# Patient Record
Sex: FEMALE | Race: WHITE | NOT HISPANIC OR LATINO | Employment: FULL TIME | URBAN - METROPOLITAN AREA
[De-identification: names, ages, dates, MRNs, and addresses within clinical notes are randomized per-mention and may not be internally consistent; named-entity substitution may affect disease eponyms.]

---

## 2024-10-29 ENCOUNTER — OFFICE VISIT (OUTPATIENT)
Dept: URGENT CARE | Facility: CLINIC | Age: 29
End: 2024-10-29
Payer: MEDICAID

## 2024-10-29 VITALS
HEIGHT: 68 IN | BODY MASS INDEX: 16.82 KG/M2 | SYSTOLIC BLOOD PRESSURE: 119 MMHG | DIASTOLIC BLOOD PRESSURE: 75 MMHG | TEMPERATURE: 99 F | RESPIRATION RATE: 18 BRPM | OXYGEN SATURATION: 98 % | HEART RATE: 65 BPM | WEIGHT: 111 LBS

## 2024-10-29 DIAGNOSIS — R05.9 COUGH, UNSPECIFIED TYPE: Primary | ICD-10-CM

## 2024-10-29 DIAGNOSIS — R82.998 DARK URINE: ICD-10-CM

## 2024-10-29 DIAGNOSIS — J22 ACUTE RESPIRATORY INFECTION: ICD-10-CM

## 2024-10-29 LAB
BILIRUBIN, UA POC OHS: NEGATIVE
BLOOD, UA POC OHS: ABNORMAL
CLARITY, UA POC OHS: CLEAR
COLOR, UA POC OHS: COLORLESS
CTP QC/QA: YES
GLUCOSE, UA POC OHS: NEGATIVE
KETONES, UA POC OHS: NEGATIVE
LEUKOCYTES, UA POC OHS: NEGATIVE
MOLECULAR STREP A: NEGATIVE
NITRITE, UA POC OHS: NEGATIVE
PH, UA POC OHS: 7
POC MOLECULAR INFLUENZA A AGN: NEGATIVE
POC MOLECULAR INFLUENZA B AGN: NEGATIVE
POC RSV RAPID ANT MOLECULAR: NEGATIVE
PROTEIN, UA POC OHS: NEGATIVE
SARS-COV-2 AG RESP QL IA.RAPID: NEGATIVE
SPECIFIC GRAVITY, UA POC OHS: 1.01
UROBILINOGEN, UA POC OHS: 0.2

## 2024-10-29 PROCEDURE — 87634 RSV DNA/RNA AMP PROBE: CPT | Mod: QW,S$GLB,, | Performed by: FAMILY MEDICINE

## 2024-10-29 PROCEDURE — 81003 URINALYSIS AUTO W/O SCOPE: CPT | Mod: QW,S$GLB,, | Performed by: FAMILY MEDICINE

## 2024-10-29 PROCEDURE — 99214 OFFICE O/P EST MOD 30 MIN: CPT | Mod: S$GLB,,, | Performed by: FAMILY MEDICINE

## 2024-10-29 PROCEDURE — 87811 SARS-COV-2 COVID19 W/OPTIC: CPT | Mod: QW,S$GLB,, | Performed by: FAMILY MEDICINE

## 2024-10-29 PROCEDURE — 87502 INFLUENZA DNA AMP PROBE: CPT | Mod: QW,S$GLB,, | Performed by: FAMILY MEDICINE

## 2024-10-29 PROCEDURE — 87651 STREP A DNA AMP PROBE: CPT | Mod: QW,S$GLB,, | Performed by: FAMILY MEDICINE

## 2024-10-29 RX ORDER — METHYLPREDNISOLONE 4 MG/1
TABLET ORAL
Qty: 21 EACH | Refills: 0 | Status: SHIPPED | OUTPATIENT
Start: 2024-10-29 | End: 2024-11-19

## 2024-10-29 RX ORDER — ALBUTEROL SULFATE 90 UG/1
2 INHALANT RESPIRATORY (INHALATION) EVERY 6 HOURS PRN
Qty: 18 G | Refills: 0 | Status: SHIPPED | OUTPATIENT
Start: 2024-10-29

## 2024-10-29 RX ORDER — ACETAMINOPHEN 500 MG
1000 TABLET ORAL
Status: COMPLETED | OUTPATIENT
Start: 2024-10-29 | End: 2024-10-29

## 2024-10-29 RX ORDER — PROMETHAZINE HYDROCHLORIDE AND DEXTROMETHORPHAN HYDROBROMIDE 6.25; 15 MG/5ML; MG/5ML
SYRUP ORAL
Qty: 150 ML | Refills: 0 | Status: SHIPPED | OUTPATIENT
Start: 2024-10-29

## 2024-10-29 RX ADMIN — Medication 1000 MG: at 11:10

## 2024-12-15 ENCOUNTER — OFFICE VISIT (OUTPATIENT)
Dept: URGENT CARE | Facility: CLINIC | Age: 29
End: 2024-12-15
Payer: MEDICAID

## 2024-12-15 VITALS
TEMPERATURE: 100 F | DIASTOLIC BLOOD PRESSURE: 72 MMHG | SYSTOLIC BLOOD PRESSURE: 109 MMHG | BODY MASS INDEX: 16.88 KG/M2 | HEART RATE: 87 BPM | RESPIRATION RATE: 18 BRPM | WEIGHT: 111 LBS | OXYGEN SATURATION: 98 %

## 2024-12-15 DIAGNOSIS — R09.82 POSTNASAL DRIP: ICD-10-CM

## 2024-12-15 DIAGNOSIS — R05.9 COUGH, UNSPECIFIED TYPE: ICD-10-CM

## 2024-12-15 DIAGNOSIS — J10.1 INFLUENZA A: Primary | ICD-10-CM

## 2024-12-15 LAB
CTP QC/QA: YES
POC MOLECULAR INFLUENZA A AGN: POSITIVE
POC MOLECULAR INFLUENZA B AGN: NEGATIVE

## 2024-12-15 PROCEDURE — 99213 OFFICE O/P EST LOW 20 MIN: CPT | Mod: S$GLB,,,

## 2024-12-15 PROCEDURE — 87502 INFLUENZA DNA AMP PROBE: CPT | Mod: QW,S$GLB,,

## 2024-12-15 RX ORDER — CETIRIZINE HYDROCHLORIDE 10 MG/1
10 TABLET ORAL DAILY
Qty: 10 TABLET | Refills: 0 | Status: SHIPPED | OUTPATIENT
Start: 2024-12-15 | End: 2024-12-25

## 2024-12-15 RX ORDER — OSELTAMIVIR PHOSPHATE 75 MG/1
75 CAPSULE ORAL 2 TIMES DAILY
Qty: 10 CAPSULE | Refills: 0 | Status: SHIPPED | OUTPATIENT
Start: 2024-12-15 | End: 2024-12-20

## 2024-12-15 NOTE — LETTER
December 15, 2024      [facility] [place]  [address], [address]  [facility] [address]  Phone: [phone]  Fax: [phone]       Patient: Amina Sebastian   YOB: 1995  Date of Visit: 12/15/2024    To Whom It May Concern:    SIERRA Sebastian  was at [facility] on 12/15/2024. The patient may return to work/school on 12/18/24 with no restrictions. If you have any questions or concerns, or if I can be of further assistance, please do not hesitate to contact me.    Sincerely,    Nhung Camargo NP

## 2024-12-15 NOTE — PROGRESS NOTES
Subjective:      Patient ID: Amina Sebastian is a 29 y.o. female.    Vitals:  weight is 50.3 kg (111 lb). Her tympanic temperature is 100.4 °F (38 °C) (abnormal). Her blood pressure is 109/72 and her pulse is 87. Her respiration is 18 and oxygen saturation is 98%.     Chief Complaint: Cough    Pt presents today with cough, congestion, body aches, and fever. Pt has taken tylenol at 12pm with mild relief of fever. Pt has no known exposure to covid, flu or strep.     Cough  This is a new problem. The current episode started in the past 7 days. The problem has been unchanged. The problem occurs constantly. The cough is Non-productive. Associated symptoms include a fever and postnasal drip. Pertinent negatives include no chest pain, rash or sore throat.       Constitution: Positive for fever.   HENT:  Positive for postnasal drip. Negative for sore throat.    Cardiovascular:  Negative for chest pain and palpitations.   Respiratory:  Positive for cough.    Gastrointestinal:  Negative for nausea, vomiting and diarrhea.   Musculoskeletal:  Negative for joint pain.   Skin:  Negative for rash.      Objective:     Physical Exam   Constitutional: She is oriented to person, place, and time. normal  HENT:   Head: Normocephalic and atraumatic.   Ears:   Right Ear: Tympanic membrane, external ear and ear canal normal.   Left Ear: Tympanic membrane, external ear and ear canal normal.   Nose: Rhinorrhea present.   Mouth/Throat: Mucous membranes are moist. Oropharynx is clear.   Eyes: Conjunctivae are normal. Pupils are equal, round, and reactive to light.   Neck: Neck supple.   Cardiovascular: Normal rate, regular rhythm, normal heart sounds and normal pulses.   Pulmonary/Chest: Effort normal and breath sounds normal.   Abdominal: Normal appearance. There is no abdominal tenderness.   Musculoskeletal: Normal range of motion.         General: Normal range of motion.   Neurological: She is alert and oriented to person, place, and  time.   Skin: Skin is warm and dry. Capillary refill takes less than 2 seconds.   Psychiatric: Mood normal.       Assessment:     1. Influenza A    2. Cough, unspecified type    3. Postnasal drip          Plan:     You have been diagnosed with Influenza.   You are contagious for usually the first 4 days of symptoms and for 24 hours after your last fever.  Please drink plenty of fluids.  Please get plenty of rest.  Please return here or go to the Emergency Department for any concerns or worsening of condition.  If an antiviral prescription such as Tamiflu or Xofluza has been discussed and if prescribed, please take to completion unless you cannot tolerate the side effects.   If you were prescribed a narcotic medication, do not drive or operate heavy equipment or machinery while taking these medications.  If you were given a steroid shot in the clinic and have also been given a prescription for a steroid such as Prednisone or a Medrol Dose Pack, please begin taking them tomorrow.  Take tylenol (acetominophen) for fever, chills or body aches every 4 hours. do not exceed 4000 mg/ day.  OR you may take Motrin (Ibuprofen) 600mg every 6 hours for fever, chills, pain or inflammation.  Cough and Cold Medications as needed. Use an antihistmine such as claritin or zyrtec to dry you out if you have a runny nose or postnasal drip. Use pseudoephedrine (behind the counter) to decongest (be aware this can raise your blood pressure and may cause palpitations. Delsym or Robitussin for cough. Use mucinex (guaifenisin) to break up mucous. Mucinex DM is helpful if you are congested and have a cough.     Influenza A  -     oseltamivir (TAMIFLU) 75 MG capsule; Take 1 capsule (75 mg total) by mouth 2 (two) times daily. for 5 days  Dispense: 10 capsule; Refill: 0    Cough, unspecified type  -     POCT Influenza A/B MOLECULAR    Postnasal drip  -     cetirizine (ZYRTEC) 10 MG tablet; Take 1 tablet (10 mg total) by mouth once daily. for 10  days  Dispense: 10 tablet; Refill: 0          Medical Decision Making:   Urgent Care Management:  Flu positive

## 2025-07-18 ENCOUNTER — OFFICE VISIT (OUTPATIENT)
Dept: URGENT CARE | Facility: CLINIC | Age: 30
End: 2025-07-18

## 2025-07-18 VITALS
OXYGEN SATURATION: 98 % | TEMPERATURE: 99 F | SYSTOLIC BLOOD PRESSURE: 103 MMHG | RESPIRATION RATE: 16 BRPM | HEART RATE: 66 BPM | DIASTOLIC BLOOD PRESSURE: 68 MMHG

## 2025-07-18 DIAGNOSIS — H10.9 CONJUNCTIVITIS OF RIGHT EYE, UNSPECIFIED CONJUNCTIVITIS TYPE: Primary | ICD-10-CM

## 2025-07-18 RX ORDER — TOBRAMYCIN 3 MG/ML
1 SOLUTION/ DROPS OPHTHALMIC EVERY 6 HOURS
Qty: 5 ML | Refills: 0 | Status: SHIPPED | OUTPATIENT
Start: 2025-07-18

## 2025-07-18 RX ORDER — TESTOSTERONE CYPIONATE 200 MG/ML
INJECTION, SOLUTION INTRAMUSCULAR
COMMUNITY
Start: 2025-05-16

## 2025-07-18 RX ORDER — AZELASTINE 1 MG/ML
2 SPRAY, METERED NASAL 2 TIMES DAILY
Qty: 30 ML | Refills: 2 | Status: SHIPPED | OUTPATIENT
Start: 2025-07-18

## 2025-07-18 RX ORDER — KETOROLAC TROMETHAMINE 5 MG/ML
1 SOLUTION OPHTHALMIC 2 TIMES DAILY PRN
Qty: 5 ML | Refills: 1 | Status: SHIPPED | OUTPATIENT
Start: 2025-07-18 | End: 2025-07-25

## 2025-07-18 NOTE — PROGRESS NOTES
Subjective:      Patient ID: Amina Sebastian is a 29 y.o. female.    Vitals:  oral temperature is 98.7 °F (37.1 °C). Her blood pressure is 103/68 and her pulse is 66. Her respiration is 16 and oxygen saturation is 98%.     Chief Complaint: Eye Problem    Pt c/o swelling around her right eye that started this morning when she woke up. Pt states it feels like something is in her eye.    Eye Problem   The right eye is affected. This is a new problem. The current episode started today. The problem occurs constantly. The problem has been unchanged. The injury mechanism is unknown. The patient is experiencing no pain. Associated symptoms include a foreign body sensation, itching and photophobia. Pertinent negatives include no blurred vision, eye discharge, double vision, eye redness, fever, nausea, recent URI or vomiting. She has tried eye drops for the symptoms. The treatment provided no relief.       Constitution: Negative for fever.   Eyes:  Positive for eye itching and photophobia. Negative for eye discharge, eye redness, double vision and blurred vision.   Gastrointestinal:  Negative for nausea and vomiting.      Objective:     Vitals:    07/18/25 0901   BP: 103/68   BP Location: Left arm   Patient Position: Sitting   Pulse: 66   Resp: 16   Temp: 98.7 °F (37.1 °C)   TempSrc: Oral   SpO2: 98%     Physical Exam   Constitutional: She is oriented to person, place, and time. She appears well-developed. She is cooperative.  Non-toxic appearance. She does not appear ill. No distress.   HENT:   Head: Normocephalic and atraumatic.   Ears:   Right Ear: Hearing, tympanic membrane, external ear and ear canal normal.   Left Ear: Hearing, tympanic membrane, external ear and ear canal normal.   Nose: Congestion present. No mucosal edema, rhinorrhea or nasal deformity. No epistaxis. Right sinus exhibits no maxillary sinus tenderness and no frontal sinus tenderness. Left sinus exhibits no maxillary sinus tenderness and no frontal  sinus tenderness.   Mouth/Throat: Uvula is midline, oropharynx is clear and moist and mucous membranes are normal. Mucous membranes are moist. No trismus in the jaw. Normal dentition. No uvula swelling. No oropharyngeal exudate, posterior oropharyngeal edema or posterior oropharyngeal erythema.   Eyes: Lids are normal. Pupils are equal, round, and reactive to light. Lids are everted and swept, no foreign bodies found. No scleral icterus.     Extraocular movement intact vision grossly intact gaze aligned appropriately periorbital hyperpigmentation   Neck: Trachea normal and phonation normal. Neck supple. No edema present. No erythema present. No neck rigidity present.   Cardiovascular: Normal rate, regular rhythm, normal heart sounds and normal pulses.   Pulmonary/Chest: Effort normal and breath sounds normal. No respiratory distress. She has no decreased breath sounds. She has no rhonchi.   Abdominal: Normal appearance.   Musculoskeletal: Normal range of motion.         General: No deformity. Normal range of motion.   Neurological: She is alert and oriented to person, place, and time. She exhibits normal muscle tone. Coordination normal.   Skin: Skin is warm, dry, intact, not diaphoretic and not pale.   Psychiatric: Her speech is normal and behavior is normal. Judgment and thought content normal.   Nursing note and vitals reviewed.          Assessment:     1. Conjunctivitis of right eye, unspecified conjunctivitis type        Plan:       Conjunctivitis of right eye, unspecified conjunctivitis type  -     azelastine (ASTELIN) 137 mcg (0.1 %) nasal spray; 2 sprays (274 mcg total) by Nasal route 2 (two) times daily.  Dispense: 30 mL; Refill: 2  -     ketorolac 0.5% (ACULAR) 0.5 % Drop; Place 1 drop into both eyes 2 (two) times daily as needed (itchy eye symptoms). Wash hands before and after admin  Dispense: 5 mL; Refill: 1  -     tobramycin sulfate 0.3% (TOBREX) 0.3 % ophthalmic solution; Place 1 drop into the right  eye every 6 (six) hours. Wash hands before and after admin  Dispense: 5 mL; Refill: 0      Patient Instructions   Wash hands before and after admin of eye drops   Avoid rubbing eyes   Change linens as needed   Allergy meds as discussed and sent to pharmacy   If purulent eye discharge develops, start tobramycin eye drops   Follow up with your PCP as needed or return to [facility]      May follow up with [facility] until  08/01/2025 without further charges for today's complaint and concerns      No follow-ups on file.

## 2025-07-18 NOTE — LETTER
July 18, 2025      [facility]  [address], [address]  [facility] [address]  Phone: [phone]  Fax: [phone]       Patient: Amina Sebastian   YOB: 1995  Date of Visit: 07/18/2025    To Whom It May Concern:    SIERRA Sebastian  was at [facility] on 07/18/2025. The patient may return to work/school on 07/21/2025 with no restrictions. If you have any questions or concerns, or if I can be of further assistance, please do not hesitate to contact me.    Sincerely,         Ramiro Gold, NP

## 2025-07-18 NOTE — PATIENT INSTRUCTIONS
Wash hands before and after admin of eye drops   Avoid rubbing eyes   Change linens as needed   Allergy meds as discussed and sent to pharmacy   If purulent eye discharge develops, start tobramycin eye drops   Follow up with your PCP as needed or return to [facility]      May follow up with [facility] until  08/01/2025 without further charges for today's complaint and concerns

## 2025-07-20 ENCOUNTER — OFFICE VISIT (OUTPATIENT)
Dept: URGENT CARE | Facility: CLINIC | Age: 30
End: 2025-07-20

## 2025-07-20 VITALS
WEIGHT: 111 LBS | DIASTOLIC BLOOD PRESSURE: 70 MMHG | RESPIRATION RATE: 18 BRPM | OXYGEN SATURATION: 98 % | HEIGHT: 68 IN | BODY MASS INDEX: 16.82 KG/M2 | HEART RATE: 68 BPM | TEMPERATURE: 99 F | SYSTOLIC BLOOD PRESSURE: 109 MMHG

## 2025-07-20 DIAGNOSIS — L03.211 CELLULITIS OF FACE: ICD-10-CM

## 2025-07-20 DIAGNOSIS — H01.001 BLEPHARITIS OF RIGHT UPPER EYELID, UNSPECIFIED TYPE: Primary | ICD-10-CM

## 2025-07-20 PROCEDURE — 99214 OFFICE O/P EST MOD 30 MIN: CPT | Mod: TIER,S$GLB,, | Performed by: NURSE PRACTITIONER

## 2025-07-20 RX ORDER — FLUCONAZOLE 150 MG/1
150 TABLET ORAL ONCE AS NEEDED
Qty: 3 TABLET | Refills: 0 | Status: SHIPPED | OUTPATIENT
Start: 2025-07-20

## 2025-07-20 RX ORDER — MINOCYCLINE HYDROCHLORIDE 50 MG/1
50 TABLET ORAL EVERY 12 HOURS
Qty: 14 TABLET | Refills: 0 | Status: SHIPPED | OUTPATIENT
Start: 2025-07-20 | End: 2025-07-27

## 2025-07-20 NOTE — PATIENT INSTRUCTIONS
Wash hands before and after admin of eye drops   Avoid rubbing eyes    Add on oral antibiotic   Consider rosacea as cause of eyelid inflammation and follow up as needed   Change linens as needed   Diflucan prn yeast vaginitis from oral antibiotic   Follow up with your PCP as needed or return to [facility] through 08/01/2025 under 07/18/2025 visit complaints without charge

## 2025-07-20 NOTE — PROGRESS NOTES
"Subjective:      Patient ID: Amina Sebastian is a 29 y.o. female.    Vitals:  height is 5' 8" (1.727 m) and weight is 50.3 kg (111 lb). Her oral temperature is 98.7 °F (37.1 °C). Her blood pressure is 109/70 and her pulse is 68. Her respiration is 18 and oxygen saturation is 98%.     Chief Complaint: Eye Problem (right)    Pt returning with problem of conjunctivitis.  Pt has been using Abx eye drops, nose spray and allergy eye drops, with no relief.    Eye discharge, redness, tenderness and swelling.      Eye Problem   The right eye is affected. The pain is at a severity of 3/10. She Does not wear contacts. Associated symptoms include an eye discharge, eye redness and itching. Pertinent negatives include no fever, foreign body sensation or photophobia. The treatment provided no relief.       Constitution: Negative for fever.   Eyes:  Positive for eye discharge, eye itching and eye redness. Negative for photophobia.      Objective:     Vitals:    07/20/25 1043   BP: 109/70   BP Location: Left arm   Patient Position: Sitting   Pulse: 68   Resp: 18   Temp: 98.7 °F (37.1 °C)   TempSrc: Oral   SpO2: 98%   Weight: 50.3 kg (111 lb)   Height: 5' 8" (1.727 m)       Physical Exam   Constitutional: She is oriented to person, place, and time. She appears well-developed.   HENT:   Head: Normocephalic and atraumatic.   Ears:   Right Ear: External ear normal.   Left Ear: External ear normal.   Nose: Nose normal.   Mouth/Throat: Oropharynx is clear and moist.   Eyes: Conjunctivae, EOM and lids are normal. Pupils are equal, round, and reactive to light.     Extraocular movement intact      Comments: Expanding redness and tenderness of R upper and lower eyelid    Neck: Trachea normal and phonation normal. Neck supple.   Musculoskeletal: Normal range of motion.         General: Normal range of motion.   Neurological: She is alert and oriented to person, place, and time.   Skin: Skin is warm, dry and intact.   Psychiatric: Her " speech is normal and behavior is normal. Judgment and thought content normal.   Nursing note and vitals reviewed.        Assessment:     1. Blepharitis of right upper eyelid, unspecified type    2. Cellulitis of face        Plan:   Self pay patient seen 07/18/2025   Noted increase in inflammation and tenderness of R eye lid areas   Discussed with patient addition of oral antibiotic to reduce inflammation/infection; additional ddx to consider rosacea    Patient agreeable to plan and verbalized understanding     Blepharitis of right upper eyelid, unspecified type  -     minocycline (DYNACIN) 50 MG Tab; Take 1 tablet (50 mg total) by mouth every 12 (twelve) hours. Take with food for 7 days  Dispense: 14 tablet; Refill: 0    Cellulitis of face    Other orders  -     fluconazole (DIFLUCAN) 150 MG Tab; Take 1 tablet (150 mg total) by mouth 1 (one) time if needed (yeast vaginitis x 1 dose; may repeat in 3 or 7 days).  Dispense: 3 tablet; Refill: 0      Patient Instructions   Wash hands before and after admin of eye drops   Avoid rubbing eyes    Add on oral antibiotic   Consider rosacea as cause of eyelid inflammation and follow up as needed   Change linens as needed   Diflucan prn yeast vaginitis from oral antibiotic   Follow up with your PCP as needed or return to [facility] through 08/01/2025 under 07/18/2025 visit complaints without charge      No follow-ups on file.